# Patient Record
Sex: FEMALE | Race: OTHER | NOT HISPANIC OR LATINO
[De-identification: names, ages, dates, MRNs, and addresses within clinical notes are randomized per-mention and may not be internally consistent; named-entity substitution may affect disease eponyms.]

---

## 2017-05-26 ENCOUNTER — APPOINTMENT (OUTPATIENT)
Dept: RADIOLOGY | Facility: IMAGING CENTER | Age: 32
End: 2017-05-26

## 2017-05-26 ENCOUNTER — OUTPATIENT (OUTPATIENT)
Dept: OUTPATIENT SERVICES | Facility: HOSPITAL | Age: 32
LOS: 1 days | End: 2017-05-26
Payer: COMMERCIAL

## 2017-05-26 DIAGNOSIS — R06.2 WHEEZING: ICD-10-CM

## 2017-05-26 DIAGNOSIS — R05 COUGH: ICD-10-CM

## 2017-05-26 PROCEDURE — 71046 X-RAY EXAM CHEST 2 VIEWS: CPT

## 2020-12-26 ENCOUNTER — EMERGENCY (EMERGENCY)
Facility: HOSPITAL | Age: 35
LOS: 1 days | Discharge: HOME | End: 2020-12-26
Attending: STUDENT IN AN ORGANIZED HEALTH CARE EDUCATION/TRAINING PROGRAM
Payer: COMMERCIAL

## 2020-12-26 VITALS
DIASTOLIC BLOOD PRESSURE: 67 MMHG | SYSTOLIC BLOOD PRESSURE: 111 MMHG | HEART RATE: 73 BPM | HEIGHT: 61 IN | RESPIRATION RATE: 16 BRPM | WEIGHT: 147.05 LBS | TEMPERATURE: 98 F | OXYGEN SATURATION: 95 %

## 2020-12-26 VITALS
HEART RATE: 68 BPM | RESPIRATION RATE: 16 BRPM | OXYGEN SATURATION: 99 % | DIASTOLIC BLOOD PRESSURE: 68 MMHG | SYSTOLIC BLOOD PRESSURE: 118 MMHG

## 2020-12-26 LAB — HCG UR QL: NEGATIVE — SIGNIFICANT CHANGE UP

## 2020-12-26 PROCEDURE — 73590 X-RAY EXAM OF LOWER LEG: CPT

## 2020-12-26 PROCEDURE — 73610 X-RAY EXAM OF ANKLE: CPT

## 2020-12-26 PROCEDURE — 76377 3D RENDER W/INTRP POSTPROCES: CPT | Mod: 26

## 2020-12-26 PROCEDURE — 76377 3D RENDER W/INTRP POSTPROCES: CPT

## 2020-12-26 PROCEDURE — 99285 EMERGENCY DEPT VISIT HI MDM: CPT

## 2020-12-26 PROCEDURE — 73630 X-RAY EXAM OF FOOT: CPT | Mod: 26,RT

## 2020-12-26 PROCEDURE — 73590 X-RAY EXAM OF LOWER LEG: CPT | Mod: 26,RT

## 2020-12-26 PROCEDURE — 73610 X-RAY EXAM OF ANKLE: CPT | Mod: 26,RT

## 2020-12-26 PROCEDURE — 73700 CT LOWER EXTREMITY W/O DYE: CPT | Mod: 26,RT

## 2020-12-26 PROCEDURE — 73600 X-RAY EXAM OF ANKLE: CPT

## 2020-12-26 PROCEDURE — 73630 X-RAY EXAM OF FOOT: CPT

## 2020-12-26 PROCEDURE — 81025 URINE PREGNANCY TEST: CPT

## 2020-12-26 PROCEDURE — 73700 CT LOWER EXTREMITY W/O DYE: CPT

## 2020-12-26 RX ORDER — OXYCODONE HYDROCHLORIDE 5 MG/1
1 TABLET ORAL
Qty: 9 | Refills: 0
Start: 2020-12-26 | End: 2020-12-28

## 2020-12-26 RX ORDER — IBUPROFEN 200 MG
600 TABLET ORAL ONCE
Refills: 0 | Status: COMPLETED | OUTPATIENT
Start: 2020-12-26 | End: 2020-12-26

## 2020-12-26 RX ORDER — ACETAMINOPHEN 500 MG
975 TABLET ORAL ONCE
Refills: 0 | Status: COMPLETED | OUTPATIENT
Start: 2020-12-26 | End: 2020-12-26

## 2020-12-26 RX ADMIN — Medication 975 MILLIGRAM(S): at 10:20

## 2020-12-26 RX ADMIN — Medication 600 MILLIGRAM(S): at 10:20

## 2020-12-26 NOTE — ED PROVIDER NOTE - PHYSICAL EXAMINATION
General: NAD, good hygiene, well developed  Cardiovascular: RRR, S1&2, no M or R, radial pulses equal and b/l  Respiratory: CTABL, no wheezes or crackles, no decreased breath sounds  Extremities: right ankle TTP of the medial and lateral malleolus, hematoma of the anterior medial aspect of the ankle, neurovascular intact b/l, strength decreased on the right w. pedal and dorsal flexion due to pain, no TTP to the calf to the knee, FROM of the left LE, no edema of the legs/feet, DP/PT equal b/l  Skin: warm, well perfused  Neurologic: nonfocal, AAOx3  Psych: normal mood and affect

## 2020-12-26 NOTE — ED PROVIDER NOTE - CARE PROVIDER_API CALL
Vidal Fry  ORTHOPAEDIC SURGERY  [address] [place] [address], [address]  [place] [address]  Phone: [phone]  Fax: [phone]  Follow Up Time: 4-6 Days

## 2020-12-26 NOTE — ED PROVIDER NOTE - CLINICAL SUMMARY MEDICAL DECISION MAKING FREE TEXT BOX
mechanical fall 1 hr ago, no loc or syncope, right ankle pain w. visual hematoma, concerning for fx, will get xray of the foot, ankle and tibia despite no pain with calf palpation. pain control.

## 2020-12-26 NOTE — ED PROVIDER NOTE - OBJECTIVE STATEMENT
35F, p.w R ankle pain s/p mechanical all while doing laundry today. she fell forwards w.o LOC or head injury. incident happened 1 hr ago, unable to ambulate due to R ankle pain. ems was called. no lightheadedness, vision change, n/v, chest pain or shortness of breath. no ankle surgeries.

## 2020-12-26 NOTE — ED ADULT NURSE NOTE - CAS ELECT INFOMATION PROVIDED
pt verbalized discharge instructions and follow up care. pt had no complaints no distress noted. pt demonstrated proper crutch technique, pt left with

## 2020-12-26 NOTE — CONSULT NOTE ADULT - ASSESSMENT
A/P:  34 yo F s/p MF with R Ankle trimalleolar fracture:  -NWB RLE in trilam splint  -pain control  -FU CT R ankle  -discussed need for operative fixation in near future, pt aware and will follow up in office for preop planning  -ice and elevation  -discussed signs and sx of compartment syndrome, advised to return to ED if any were to present  -FU with Dr. Fry in office in 3-5 days, call office for an appt  -discussed with Dr. Fry, aware and in agreement with above plan

## 2020-12-26 NOTE — ED PROVIDER NOTE - PATIENT PORTAL LINK FT
You can access the FollowMyHealth Patient Portal offered by [facility] by registering at the following website: [email or web address]. By joining [employer]’s FollowMyHealth portal, you will also be able to view your health information using other applications (apps) compatible with our system.

## 2020-12-26 NOTE — CONSULT NOTE ADULT - SUBJECTIVE AND OBJECTIVE BOX
HPI:  36 yo F w/ no significant PMHx who presents to the ED with right ankle pain. Pt states she was doing laundry today, fell forward and twisted her right ankle. Denies head trauma or LOC. No other injuries. No numbness or tingling. States she was unable to walk after. Denies prior ortho surgeries. No AC. No recent fevers, chills, sob, cp, n/v.    PAST MEDICAL & SURGICAL HISTORY:  No pertinent past medical history    No significant past surgical history    Home Medications:  ibuprofen 600 mg oral tablet: 1 tab(s) orally 4 times a day (22 Nov 2015 13:34)  Prenatal 1 Plus 1:    (22 Nov 2015 13:34)    Allergies    No Known Allergies    Intolerances    Vital Signs Last 24 Hrs  T(C): 36.8 (26 Dec 2020 10:24), Max: 36.8 (26 Dec 2020 09:23)  T(F): 98.2 (26 Dec 2020 10:24), Max: 98.2 (26 Dec 2020 09:23)  HR: 68 (26 Dec 2020 10:24) (68 - 73)  BP: 110/62 (26 Dec 2020 10:24) (110/62 - 111/67)  BP(mean): --  RR: 16 (26 Dec 2020 10:24) (16 - 16)  SpO2: 99% (26 Dec 2020 10:24) (95% - 99%)    PE:  Gen: NAD  RLE:  Skin intact, moderate swelling about the ankle  Compartments soft and compressible  +ttp lateral and medial malleolus  +EHL/FHL/Gsc/TA  SILT L2-S1  2+ DP    Secondary Survey:  No head trauma  No ttp along c/t/l/s spine  No ttp along bony prominences diffusely, other than mentioned above  +A/PROM intact in all joints diffusely, other than mentioned above  SILT/NVI diffusely  Compartments soft and compressible diffusely    XR R Ankle: demonstrating right trimalleolar ankle fracture    Imaging reviewed in detail prior to reduction  Verbal consent obtained   Pt NVI pre procedure  Reduction performed  Pt placed in well padded trilam splint  Pt NVI post procedure  Post reduction Xrays demonstrating adequate reduction and alignment

## 2020-12-26 NOTE — ED PROVIDER NOTE - NS ED ROS FT
GENERAL: No fever or chills, weight changes, nightsweats  EYES: no change in vision  HEENT: no dysplasia, odynophagia, ear pain, rhinorrhea, epistasis   CARDIAC: no chest pain, palpitation   PULMONARY: no productive cough or SOB  GI: no abdominal pain, no nausea or no vomiting, no diarrhea or constipation  : No changes in urination for pain/freq.   SKIN: no rashes, abnormal bruising or bleeding  NEURO: no headache, numbness/tingling, extremity weakness   MSK: RIght ankle pain

## 2020-12-26 NOTE — ED PROVIDER NOTE - PROGRESS NOTE DETAILS
Henley DO: ortho consulted. pt informed of current findings and plan of care . pain controlled. Henley DO: pt educated on splint care/ crutch use by dr dale stable for dc, I explained all results and need for ortho f/u at time of discharge

## 2020-12-26 NOTE — ED PROVIDER NOTE - NSFOLLOWUPINSTRUCTIONS_ED_ALL_ED_FT
You were found to have a Right Ankle trimalleolar fracture:  You cannot bear weight on the right leg. Remain in the splint. Elevate at night to prevent swelling. Apply ice to help decrease swelling and improve pain,.   Use crutches to help with walking.   Do not get your splint wet.     Manage pain with over the counter tylenol/ motrin. You can take up to 600mg of motrin every 8 hours with food as needed for pain.   For SEVERE pain only, you can take 5mg of oxycodone every 8 hours.   Take oxycodone only as needed, as directed, for breakthrough pain. DO NOT DRINK OR OPERATE HEAVY MACHINERY UNDER THE INFLUENCE OF OXYCODONE. Additionally, take a stool softener while taking this medication.    Call Dr Fry's office for a follow up appointment in 3-5 days.     Return to the emergency room if you have SEVERE pain of the leg, coldness of the leg, numbness or any new or worsening symptoms.     For clot prevention, you can take aspirin 81mg daily- there is controversial data regarding this but you are low risk so you can also not take it if you choose to.

## 2020-12-26 NOTE — ED PROVIDER NOTE - ATTENDING CONTRIBUTION TO CARE
Henley DO: I have personally performed a face to face medical and diagnostic evaluation of the patient. I have discussed with and reviewed the resident's note and agree with the History, ROS, Physical Exam and MDM unless otherwise indicated. A brief summary of my personal evaluation and impression can be found below.    35F no significant pmhx c/o R ankle pain/ swelling s/p fall forward down steps at home while carrying laundry basket, denies LOC/ head trauma, denies spinal pain or back pain, denies vomiting/ nausea. On exam, TTP R medial malleolus and medial tib/fib, ecchymosis/ edema around R ankle, DP/PT pulses and sensory/motor function intact of R lower leg. No other focal findings, head atraumatic, no spinal ttp, abd soft ntnd, pt aox3 without gross neuro deficits. plan for xr- suspect fx vs sprain, pain control.

## 2020-12-26 NOTE — ED PROVIDER NOTE - NS ED MD DISPO DISCHARGE
There are always viral illnesses going around. Pt should make an appt if she is not getting better after a few days or if she is unable to eat or has severe abd pain. Any other symptoms? Fever?
Home

## 2020-12-26 NOTE — ED ADULT NURSE NOTE - NSIMPLEMENTINTERV_GEN_ALL_ED
Implemented All Fall Risk Interventions:  [place] to call system. Call bell, personal items and telephone within reach. Instruct patient to call for assistance. Room bathroom lighting operational. Non-slip footwear when patient is off stretcher. Physically safe environment: no spills, clutter or unnecessary equipment. Stretcher in lowest position, wheels locked, appropriate side rails in place. Provide visual cue, wrist band, yellow gown, etc. Monitor gait and stability. Monitor for mental status changes and reorient to person, place, and time. Review medications for side effects contributing to fall risk. Reinforce activity limits and safety measures with patient and family.

## 2020-12-26 NOTE — ED ADULT NURSE NOTE - OBJECTIVE STATEMENT
34 y/o female PMHX ASTHMA, BIBA S/P fall half way down the stairs at home, she called her  who came home to help her, pt denies hitting her head or LOC, no obvious signs of trauma noted on pt head, pt complains of pain to the right ankle, +pulse, +Sensory, limited motor, slight swelling noted. pt denies any other symptoms, safety precautions in place. call bell in reach.

## 2021-07-27 NOTE — ED ADULT TRIAGE NOTE - ARRIVAL FROM
PA started 7/27/21  (Key: [id number])  Med:testosterone (AndroGel) 25 MG/2.5GM (1%) gel  Status:sent to plan/waiting for determination        Home

## 2024-03-04 NOTE — ED ADULT NURSE NOTE - SUICIDE SCREENING DEPRESSION
Received report from NOC shift RN. Patient is A&Ox4, VSS, no signs of distress. All questions and concerns answered, call light in reach, plan of care ongoing.   Negative